# Patient Record
Sex: MALE
[De-identification: names, ages, dates, MRNs, and addresses within clinical notes are randomized per-mention and may not be internally consistent; named-entity substitution may affect disease eponyms.]

---

## 2017-04-04 NOTE — EDM.PDOC
ED HISTORY OF PRESENT ILLNESS





- General


Chief Complaint: Respiratory Problem


Stated Complaint: SHORTNESS OF BREATH


Time Seen by Provider: 04/04/17 23:05





- History of Present Illness


INITIAL COMMENTS - FREE TEXT/NARRATIVE: 





HISTORY AND PHYSICAL:





History of present illness:


Patient is a 61-year-old white male with a 30+ pack year history smoking quit 

about 6 years prior who was recently put on inhalers and antibiotics for a 

respiratory infection and shortness of breath he presents tonight with 

increasing shortness of breath he denies chest pain nausea vomiting diaphoresis 

or other concerns he states he has a history of hypertension but takes no 

medication





Review of systems: 


As per history of present illness and below otherwise all systems reviewed and 

negative.





Past medical history: 


As per history of present illness and as reviewed below otherwise 

noncontributory.





Surgical history: 


As per history of present illness and as reviewed below otherwise 

noncontributory.





Social history: 


No reported history of drug or alcohol abuse.





Family history: 


As per history of present illness and as reviewed below otherwise 

noncontributory.





Physical exam:


HEENT: Atraumatic, normocephalic, pupils reactive, negative for conjunctival 

pallor or scleral icterus, mucous membranes moist, throat clear, neck supple, 

nontender, trachea midline.


Lungs: Diminished bilaterally with end expiratory wheezing scant basilar 

crackles noted, breath sounds equal bilaterally, chest nontender.


Heart: S1S2, regular, negative for clicks, rubs, or JVD.


Abdomen: Soft, nondistended, nontender. Negative for masses or 

hepatosplenomegaly. Negative for costovertebral tenderness.


Pelvis: Stable nontender.


Genitourinary: Deferred.


Rectal: Deferred.


Extremities: Atraumatic, negative for cords or calf pain. Neurovascular 

unremarkable.


Neuro: Awake, alert, oriented. Cranial nerves II through XII unremarkable. 

Cerebellum unremarkable. Motor and sensory unremarkable throughout. Exam 

nonfocal.





Diagnostics:


CBC CMP troponin PT/INR chest x-ray EKG





Therapeutics:


IV O2 monitor nitro paste 1 inch to chest wall aspirin 324 mg by mouth 

albuterol ipratropium nebulizer Solu-Medrol 125 mg IV





Impression: 


#1 acute dyspnea #2 hypertension #3 congestive heart failure #4 COPD





Definitive disposition and diagnosis as appropriate pending reevaluation and 

review of above.





- Related Data


Allergies/ADRs: 


 Allergies











Allergy/AdvReac Type Severity Reaction Status Date / Time


 


No Known Allergies Allergy   Verified 04/04/17 23:04











Home Meds: 


 Home Meds





Albuterol [Ventolin HFA] 0 gm INH ASDIRECTED 04/04/17 [History]











Social & Family History





- Tobacco Use


Smoking Status *Q: Former Smoker (quit 3 years ago)





- Alcohol Use


Days Per Week of Alcohol Use: 0





- Recreational Drug Use


Recreational Drug Use: No


Drug Use in Last 12 Months: No





ED ROS GENERAL





- Review of Systems


Review Of Systems: ROS reveals no pertinent complaints other than HPI.





ED EXAM, GENERAL





- Physical Exam


Exam: See Below (See dictation)





Course





- Vital Signs


Text/Narrative:: 





Patient has marked improvement of his dyspnea in the emergency department he 

remained without chest pain nausea vomiting or diaphoresis patient's blood 

pressure remained elevated and nitroglycerin drip was initiated patient will be 

admitted to the ICU with diagnoses of #1 CHF #2 COPD #3 dyspnea #4 hypertension 

#5 medical noncompliance


Last Recorded V/S: 


 Last Vital Signs











Temp  37.1 C   04/04/17 23:07


 


Pulse  97   04/04/17 23:07


 


Resp  22 H  04/04/17 23:07


 


BP  225/141 H  04/04/17 23:07


 


Pulse Ox  95   04/04/17 23:07














- Orders/Labs/Meds


Orders: 


 Active Orders 24 hr











 Category Date Time Status


 


 Cardiac Monitoring [RC] .AS DIRECTED Care  04/04/17 23:05 Active


 


 EKG Documentation Completion [RC] STAT Care  04/04/17 23:05 Active


 


 Oxygen Therapy, ED [RC] ASDIRECTED Care  04/04/17 23:05 Active


 


 Pulse Oximetry [RC] ASDIRECTED Care  04/04/17 23:05 Active


 


 RT Aerosol Therapy [RC] ASDIRECTED Care  04/04/17 23:07 Active


 


 Chest 2V [CR] Stat Exams  04/04/17 23:06 Taken


 


 Nitroglycerin/D5W [Nitroglycerin  25 MG/D5W 250 ML] Med  04/05/17 00:30 Active





 25 mg in 250 ml IV TITRATE   


 


 Sodium Chloride 0.9% [Normal Saline] 1,000 ml Med  04/04/17 23:15 Active





 IV STAT   


 


 Sodium Chloride 0.9% [Saline Flush] Med  04/04/17 23:06 Active





 10 ml FLUSH ASDIRECTED PRN   


 


 Sodium Chloride 0.9% [Saline Flush] Med  04/04/17 23:06 Active





 2.5 ml FLUSH ASDIRECTED PRN   


 


 Saline Lock Insert [OM.PC] Stat Oth  04/04/17 23:05 Ordered








 Medication Orders





Sodium Chloride (Normal Saline)  1,000 mls @ 125 mls/hr IV STAT DIXIE


   Last Admin: 04/04/17 23:21  Dose: 125 mls/hr


Nitroglycerin/Dextrose (Nitroglycerin  25 Mg/D5w 250 Ml)  25 mg in 250 mls @ 12 

mls/hr IV TITRATE DIXIE; 20 MCG/MIN


   PRN Reason: Protocol


Sodium Chloride (Saline Flush)  10 ml FLUSH ASDIRECTED PRN


   PRN Reason: Keep Vein Open


Sodium Chloride (Saline Flush)  2.5 ml FLUSH ASDIRECTED PRN


   PRN Reason: Keep Vein Open








Labs: 


 Laboratory Tests











  04/04/17 04/04/17 04/04/17 Range/Units





  23:35 23:35 23:35 


 


WBC  6.33    (4.0-11.0)  K/uL


 


RBC  2.98 L    (4.50-5.90)  M/uL


 


Hgb  9.1 L    (13.0-17.0)  g/dL


 


Hct  27.2 L    (38.0-50.0)  %


 


MCV  91.3    (80.0-98.0)  fL


 


MCH  30.5    (27.0-32.0)  pg


 


MCHC  33.5    (31.0-37.0)  g/dL


 


RDW Std Deviation  44.8    (28.0-62.0)  fl


 


RDW Coeff of Francisco  14    (11.0-15.0)  %


 


Plt Count  254    (150-400)  K/uL


 


MPV  10.20    (7.40-12.00)  fL


 


Neut % (Auto)  76.8    (48.0-80.0)  %


 


Lymph % (Auto)  15.8 L    (16.0-40.0)  %


 


Mono % (Auto)  6.2    (0.0-15.0)  %


 


Eos % (Auto)  0.9    (0.0-7.0)  %


 


Baso % (Auto)  0.3    (0.0-1.5)  %


 


Neut # (Auto)  4.9    (1.4-5.7)  K/uL


 


Lymph # (Auto)  1.0    (0.6-2.4)  K/uL


 


Mono # (Auto)  0.4    (0.0-0.8)  K/uL


 


Eos # (Auto)  0.1    (0.0-0.7)  K/uL


 


Baso # (Auto)  0.0    (0.0-0.1)  K/uL


 


Nucleated RBC %  0.0    /100WBC


 


Nucleated RBCs #  0    K/uL


 


INR   1.17 H   (0.86-1.11)  


 


Sodium    136  (136-146)  mmol/L


 


Potassium    3.6  (3.5-5.1)  mmol/L


 


Chloride    102  ()  mmol/L


 


Carbon Dioxide    18 L  (21-31)  mmol/L


 


BUN    110 H  (6.0-23.0)  mg/dL


 


Creatinine    8.8 H  (0.6-1.5)  mg/dL


 


Est Cr Clr Drug Dosing    TNP  


 


Estimated GFR (MDRD)    6.2  ml/min


 


Glucose    119 H  ()  mg/dL


 


Calcium    8.0 L  (8.8-10.8)  mg/dL


 


Total Bilirubin    0.2  (0.1-1.5)  mg/dL


 


AST    35  (5-40)  IU/L


 


ALT    22  (8-54)  IU/L


 


Alkaline Phosphatase    53  ()  


 


Troponin I     (0.0-0.29)  NG/ML


 


B-Natriuretic Peptide     (<100)  PG/ML


 


Total Protein    6.2  (6.0-8.0)  g/dL


 


Albumin    3.1 L  (3.5-5.0)  g/dL


 


Globulin    3.1  (2.0-3.5)  g/dL


 


Albumin/Globulin Ratio    1.0 L  (1.3-2.8)  














  04/04/17 04/04/17 Range/Units





  23:35 23:35 


 


WBC    (4.0-11.0)  K/uL


 


RBC    (4.50-5.90)  M/uL


 


Hgb    (13.0-17.0)  g/dL


 


Hct    (38.0-50.0)  %


 


MCV    (80.0-98.0)  fL


 


MCH    (27.0-32.0)  pg


 


MCHC    (31.0-37.0)  g/dL


 


RDW Std Deviation    (28.0-62.0)  fl


 


RDW Coeff of Francisco    (11.0-15.0)  %


 


Plt Count    (150-400)  K/uL


 


MPV    (7.40-12.00)  fL


 


Neut % (Auto)    (48.0-80.0)  %


 


Lymph % (Auto)    (16.0-40.0)  %


 


Mono % (Auto)    (0.0-15.0)  %


 


Eos % (Auto)    (0.0-7.0)  %


 


Baso % (Auto)    (0.0-1.5)  %


 


Neut # (Auto)    (1.4-5.7)  K/uL


 


Lymph # (Auto)    (0.6-2.4)  K/uL


 


Mono # (Auto)    (0.0-0.8)  K/uL


 


Eos # (Auto)    (0.0-0.7)  K/uL


 


Baso # (Auto)    (0.0-0.1)  K/uL


 


Nucleated RBC %    /100WBC


 


Nucleated RBCs #    K/uL


 


INR    (0.86-1.11)  


 


Sodium    (136-146)  mmol/L


 


Potassium    (3.5-5.1)  mmol/L


 


Chloride    ()  mmol/L


 


Carbon Dioxide    (21-31)  mmol/L


 


BUN    (6.0-23.0)  mg/dL


 


Creatinine    (0.6-1.5)  mg/dL


 


Est Cr Clr Drug Dosing    


 


Estimated GFR (MDRD)    ml/min


 


Glucose    ()  mg/dL


 


Calcium    (8.8-10.8)  mg/dL


 


Total Bilirubin    (0.1-1.5)  mg/dL


 


AST    (5-40)  IU/L


 


ALT    (8-54)  IU/L


 


Alkaline Phosphatase    ()  


 


Troponin I   0.27  (0.0-0.29)  NG/ML


 


B-Natriuretic Peptide  > 3306 H   (<100)  PG/ML


 


Total Protein    (6.0-8.0)  g/dL


 


Albumin    (3.5-5.0)  g/dL


 


Globulin    (2.0-3.5)  g/dL


 


Albumin/Globulin Ratio    (1.3-2.8)  











Meds: 


Medications











Generic Name Dose Route Start Last Admin





  Trade Name Freq  PRN Reason Stop Dose Admin


 


Sodium Chloride  1,000 mls @ 125 mls/hr  04/04/17 23:15  04/04/17 23:21





  Normal Saline  IV   125 mls/hr





  STAT DIXIE   Administration


 


Nitroglycerin/Dextrose  25 mg in 250 mls @ 12 mls/hr  04/05/17 00:30  





  Nitroglycerin  25 Mg/D5w 250 Ml  IV   





  TITRATE DIXIE   





  Protocol   





  20 MCG/MIN   


 


Sodium Chloride  10 ml  04/04/17 23:06  





  Saline Flush  FLUSH   





  ASDIRECTED PRN   





  Keep Vein Open   


 


Sodium Chloride  2.5 ml  04/04/17 23:06  





  Saline Flush  FLUSH   





  ASDIRECTED PRN   





  Keep Vein Open   














Discontinued Medications














Generic Name Dose Route Start Last Admin





  Trade Name Freq  PRN Reason Stop Dose Admin


 


Albuterol/Ipratropium  3 ml  04/04/17 23:06  04/04/17 23:25





  Duoneb 3.0-0.5 Mg/3 Ml  NEB  04/04/17 23:07  3 ml





  ONETIME ONE   Administration


 


Albuterol/Ipratropium  Confirm  04/04/17 23:06  04/04/17 23:26





  Duoneb 3.0-0.5 Mg/3 Ml  Administered  04/04/17 23:07  Not Given





  Dose   





  3 ml   





  .ROUTE   





  .STK-MED ONE   


 


Aspirin  324 mg  04/04/17 23:06  04/04/17 23:20





  Aspirin  PO  04/04/17 23:07  324 mg





  ONETIME ONE   Administration


 


Furosemide  40 mg  04/05/17 00:13  





  Lasix  IVPUSH  04/05/17 00:14  





  NOW ONE   


 


Methylprednisolone Sodium Succinate  125 mg  04/04/17 23:06  04/04/17 23:21





  Solu-Medrol  IVPUSH  04/04/17 23:07  125 mg





  ONETIME ONE   Administration


 


Nitroglycerin  1 gm  04/04/17 23:06  04/04/17 23:20





  Nitro-Bid 2%  TOP  04/04/17 23:07  1 gm





  ONETIME ONE   Administration














Departure





- Departure


Time of Disposition: 00:22


Disposition: Admitted As Inpatient 68


Condition: fair


Clinical Impression: 


 Congestive heart failure, Hypertension, COPD (chronic obstructive pulmonary 

disease)





Forms:  ED Department Discharge





- My Orders


Last 24 Hours: 


My Active Orders





04/04/17 23:05


Cardiac Monitoring [RC] .AS DIRECTED 


EKG Documentation Completion [RC] STAT 


Oxygen Therapy, ED [RC] ASDIRECTED 


Pulse Oximetry [RC] ASDIRECTED 


Saline Lock Insert [OM.PC] Stat 





04/04/17 23:06


Chest 2V [CR] Stat 


Sodium Chloride 0.9% [Saline Flush]   10 ml FLUSH ASDIRECTED PRN 


Sodium Chloride 0.9% [Saline Flush]   2.5 ml FLUSH ASDIRECTED PRN 





04/04/17 23:07


RT Aerosol Therapy [RC] ASDIRECTED 





04/04/17 23:15


Sodium Chloride 0.9% [Normal Saline] 1,000 ml IV STAT 





04/05/17 00:30


Nitroglycerin/D5W [Nitroglycerin  25 MG/D5W 250 ML] 25 mg in 250 ml IV TITRATE 














- Assessment/Plan


Last 24 Hours: 


My Active Orders





04/04/17 23:05


Cardiac Monitoring [RC] .AS DIRECTED 


EKG Documentation Completion [RC] STAT 


Oxygen Therapy, ED [RC] ASDIRECTED 


Pulse Oximetry [RC] ASDIRECTED 


Saline Lock Insert [OM.PC] Stat 





04/04/17 23:06


Chest 2V [CR] Stat 


Sodium Chloride 0.9% [Saline Flush]   10 ml FLUSH ASDIRECTED PRN 


Sodium Chloride 0.9% [Saline Flush]   2.5 ml FLUSH ASDIRECTED PRN 





04/04/17 23:07


RT Aerosol Therapy [RC] ASDIRECTED 





04/04/17 23:15


Sodium Chloride 0.9% [Normal Saline] 1,000 ml IV STAT 





04/05/17 00:30


Nitroglycerin/D5W [Nitroglycerin  25 MG/D5W 250 ML] 25 mg in 250 ml IV TITRATE

## 2017-04-05 NOTE — PCM.PN
- General Info


Date of Service: 04/05/17


Functional Status: Reports: urinating





- Review of Systems


General: Reports: No Symptoms


HEENT: Reports: no symptoms


Pulmonary: Reports: no symptoms


Cardiovascular: Reports: No Symptoms


Gastrointestinal: Reports: No symptoms


Genitourinary: Reports: no symptoms


Musculoskeletal: Reports: no symptoms, leg pain (had cramps last night, helped 

by improvised foot board)


Skin: Reports: no symptoms


Neurological: Reports: No Symptoms


Psychiatric: Reports: no symptoms





- Patient Data


Vitals - most recent: 


 Last Vital Signs











Temp  36.6 C   04/05/17 08:00


 


Pulse  91   04/05/17 02:45


 


Resp  23 H  04/05/17 08:00


 


BP  156/107 H  04/05/17 08:00


 


Pulse Ox  98   04/05/17 08:00











Weight - most recent: 79 kg


I&O - last 24 hours: 


 Intake & Output











 04/04/17 04/05/17 04/05/17





 22:59 06:59 14:59


 


Intake Total  250 


 


Output Total  470 


 


Balance  -220 











Lab Results last 24 hrs: 


 Laboratory Results - last 24 hr











  04/05/17 04/05/17 Range/Units





  05:04 05:04 


 


Sodium  136   (136-146)  mmol/L


 


Potassium  3.9   (3.5-5.1)  mmol/L


 


Chloride  103   ()  mmol/L


 


Carbon Dioxide  16 L   (21-31)  mmol/L


 


BUN  106 H   (6.0-23.0)  mg/dL


 


Creatinine  8.5 H   (0.6-1.5)  mg/dL


 


Est Cr Clr Drug Dosing  9.66   mL/min


 


Estimated GFR (MDRD)  6.5   ml/min


 


Glucose  143 H   ()  mg/dL


 


Calcium  7.7 L   (8.8-10.8)  mg/dL


 


Troponin I   0.24  (0.0-0.29)  NG/ML











Med Orders - Current: 


 Current Medications





Acetaminophen (Tylenol)  650 mg PO Q4H PRN


   PRN Reason: Pain (Mild 1-3)/fever


Albuterol/Ipratropium (Duoneb 3.0-0.5 Mg/3 Ml)  3 ml NEB Q4HRRT PRN


   PRN Reason: Shortness Of Breath/wheezing


Clonidine HCl (Catapres)  0.2 mg PO Q8H DIXIE


   Last Admin: 04/05/17 01:52 Dose:  0.2 mg


Sodium Chloride (Normal Saline)  1,000 mls @ 25 mls/hr IV STAT DIXIE


   Last Admin: 04/04/17 23:21 Dose:  125 mls/hr


Nitroglycerin/Dextrose (Nitroglycerin  25 Mg/D5w 250 Ml)  25 mg in 250 mls @ 12 

mls/hr IV TITRATE DIXIE; 20 MCG/MIN


   PRN Reason: Protocol


   Last Titration: 04/05/17 06:00 Dose:  5 mcg/min, 3 mls/hr


Sodium Chloride (Saline Flush)  10 ml FLUSH ASDIRECTED PRN


   PRN Reason: Keep Vein Open


Sodium Chloride (Saline Flush)  2.5 ml FLUSH ASDIRECTED PRN


   PRN Reason: Keep Vein Open





Discontinued Medications





Albuterol/Ipratropium (Duoneb 3.0-0.5 Mg/3 Ml)  3 ml NEB ONETIME ONE


   Stop: 04/04/17 23:07


   Last Admin: 04/04/17 23:25 Dose:  3 ml


Albuterol/Ipratropium (Duoneb 3.0-0.5 Mg/3 Ml) Confirm Administered Dose 3 ml 

.ROUTE .STK-MED ONE


   Stop: 04/04/17 23:07


   Last Admin: 04/04/17 23:26 Dose:  Not Given


Aspirin (Aspirin)  324 mg PO ONETIME ONE


   Stop: 04/04/17 23:07


   Last Admin: 04/04/17 23:20 Dose:  324 mg


Furosemide (Lasix)  40 mg IVPUSH NOW ONE


   Stop: 04/05/17 00:14


   Last Admin: 04/05/17 00:30 Dose:  40 mg


Labetalol HCl (Normodyne)  5 mg IVPUSH ONETIME ONE


   PRN Reason: Protocol


   Stop: 04/05/17 02:18


   Last Admin: 04/05/17 02:45 Dose:  5 mg


Methylprednisolone Sodium Succinate (Solu-Medrol)  125 mg IVPUSH ONETIME ONE


   Stop: 04/04/17 23:07


   Last Admin: 04/04/17 23:21 Dose:  125 mg


Nitroglycerin (Nitro-Bid 2%)  1 gm TOP ONETIME ONE


   Stop: 04/04/17 23:07


   Last Admin: 04/04/17 23:20 Dose:  1 gm











- Exam


General: alert


HEENT: Pupils equal


Neck: trachea midline


Lungs: Clear to auscultation


Cardiovascular: Regular Rate


Abdomen: bowel sounds present


 (Male) Exam: Deferred


Back Exam: normal inspection


Extremities: no edema


Skin: warm, intact


Wound/Incisions: healing well


Neurological: no new focal deficit


Psy/Mental Status: alert, normal affect





- Problem List Review


Problem List Initiated/Reviewed/Updated: Yes





- My Orders


Last 24 Hours: 


My Active Orders





04/05/17 00:59


Patient Status [ADT] Routine 


Oxygen Therapy [RC] PRN 


Vital Signs [RC] Q1H 


Acetaminophen [Tylenol]   650 mg PO Q4H PRN 


Albuterol/Ipratropium [DuoNeb 3.0-0.5 MG/3 ML]   3 ml NEB Q4HRRT PRN 


Resuscitation Status Routine 





04/05/17 01:06


RT Aerosol Therapy [RC] ASDIRECTED 





04/05/17 02:00


cloNIDine [Catapres]   0.2 mg PO Q8H 





04/05/17 11:30


TROPONIN I [CHEM] Q6H 





04/05/17 Breakfast


Regular Diet [DIET] 














- Assessment


Assessment:: 





blood pressure better with labetalol, 


will continue p o along with catapres will stop NTG  as ineffective/unnecessary





azotemia only marginally better after hydration 


willl need nephrology evaluation





Lung masses


imaging and biopsy 


both of the latter in [place]. Pt willing to be transferred, will arrange





- Plan


Plan:: 





hypertensive urgency


little effect of NTG drip, will add catapres, possibly other agents


Pt rports lisinopril caused hyperkalemia in past





CHF like symptoms have improved already ,certainly not fluid overloaded





 renal failure  ?acute  doubt pure dehydration, may be acute on chronic





lung mass, bilateral    need imaging when stable





Hx hepatitis C

## 2017-04-05 NOTE — PCM.HP
H&P History of Present Illness





- General


Date of Service: 04/05/17


Admit Problem/Dx: 


 Admission Diagnosis/Problem





Admission Diagnosis/Problem      CHF, Congestive heart failure








Source of Information: Patient


History Limitations: Reports: No limitations





- History of Present Illness


Initial Comments - Free Text/Narative: 





62 yo man with history hypertension,but off meds quite a while experiecing 

worsening fatigue, exertional and orthostatic dyspnea,says he has not slept for 

days. denies chest pain or edema.


Comes to ER with BP  [id number]


Onset of Symptoms: Reports: gradual


Duration of Symptoms: Reports: Week(s):


Improves with: Reports: None


Worsens with: Reports: None


Associated Symptoms: Reports: no other symptoms





- Related Data


Allergies/Adverse Reactions: 


 Allergies











Allergy/AdvReac Type Severity Reaction Status Date / Time


 


No Known Allergies Allergy   Verified 04/04/17 23:04











Home Medications: 


 Home Meds





Albuterol [Ventolin HFA] 0 gm INH ASDIRECTED 04/04/17 [History]











Past Medical History


HEENT History: Reports: Impaired vision


Other HEENT History: wears glasses


Cardiovascular History: Reports: Hypertension


Respiratory History: Reports: Other (see below)


Other Respiratory History: former smoker


Gastrointestinal History: Reports: Hepatitis (interferon for hvc 6 yrs ago)





- Past Surgical History


Other Cardiovascular Surgeries/Procedures: off htn meds x 4-5 yrs


GI Surgical History: Reports: Other (see below)


Other GI Surgeries/Procedures: hernia repair x 3





Social & Family History





- Family History


Family Medical History: Noncontributory (M   F A&W 4 brothers 3 offspring [place])





- Tobacco Use


Smoking Status *Q: Former Smoker (quit 3 years ago)


Tobacco Use Within Last Twelve Months: No


Years of Tobacco use: 30


Tobacco Use Comment: quit  6 yrs ago





- Caffeine Use


Caffeine Use: Reports: Coffee





- Alcohol Use


Days Per Week of Alcohol Use: 0





- Recreational Drug Use


Recreational Drug Use: Yes


Drug Use in Last 12 Months: No


Recreational Drug Type: Reports: Amphetamines (Speed) (remote> 20 yr parenteral)





- Living Situation & Occupation


Living situation: Reports: 


Occupation: employed ([employer], 8yr [employer] )





H&P Review of Systems





- Review of Systems:


Review Of Systems: See Below


General: Reports: fatigue


HEENT: Reports: no symptoms


Pulmonary: Reports: Shortness of Breath


Cardiovascular: Reports: no symptoms


Gastrointestinal: Reports: No symptoms


Genitourinary: Reports: no symptoms


Musculoskeletal: Reports: no symptoms


Skin: Reports: no symptoms


Psychiatric: Reports: no symptoms


Neurological: Reports: No Symptoms


Hematologic/Lymphatic: Reports: no symptoms


Immunologic: Reports: no symptoms





Exam





- Exam


Exam: See Below





- Vital Signs


Vital Signs: 


 Last Vital Signs











Temp  37.1 C   04/04/17 23:07


 


Pulse  97   04/04/17 23:07


 


Resp  22 H  04/04/17 23:07


 


BP  225/141 H  04/04/17 23:07


 


Pulse Ox  95   04/04/17 23:07











Weight: 79.379 kg





- Exam


General: alert, oriented


HEENT: Other (hazy lenticular opacities)


Neck: trachea midline


Lungs: Rales (R base)


Cardiovascular: regular rate, regular rhythm


Abdomen: normal bowel sounds


 (Male) Exam: Deferred


Rectal (Males) Exam: Deferred


Back Exam: normal inspection


Extremities: normal inspection


Skin: warm


Neurological: cranial nerves intact


Neuro Extensive - Mental Status: alert, oriented x3, normal mood/affect, memory 

intact


Neuro Extensive - Motor, Sensory, Reflexes: CN II-XII intact (not formally 

tested)


Psychiatric: alert, normal affect





- Patient Data


Result Diagrams: 


 04/04/17 23:35





 04/04/17 23:35





*Q Meaningful Use (ADM)





- VTE *Q


VTE Criteria *Q: 








- Stroke *Q


Stroke Criteria *Q: 








- AMI *Q


AMI Criteria *Q: 





Problem List Initiated/Reviewed/Updated: Yes


Orders Last 24hrs: 


 Active Orders 24 hr











 Category Date Time Status


 


 Patient Status [ADT] Routine ADT  04/05/17 00:59 Ordered


 


 Oxygen Therapy [RC] PRN Care  04/05/17 00:59 Ordered


 


 RT Aerosol Therapy [RC] ASDIRECTED Care  04/05/17 01:06 Ordered


 


 Vital Signs [RC] Q4H Care  04/05/17 00:59 Ordered


 


 Regular Diet [DIET] Diet  04/05/17 Breakfast Ordered


 


 BMP [BASIC METABOLIC PANEL,BMP] [CHEM] Routine Lab  04/05/17 06:00 Ordered


 


 Acetaminophen [Tylenol] Med  04/05/17 00:59 Ordered





 650 mg PO Q4H PRN   


 


 Albuterol/Ipratropium [DuoNeb 3.0-0.5 MG/3 ML] Med  04/05/17 00:59 Ordered





 3 ml NEB Q4HRRT PRN   


 


 Resuscitation Status Routine Resus Stat  04/05/17 00:59 Ordered








 Medication Orders





Acetaminophen (Tylenol)  650 mg PO Q4H PRN


   PRN Reason: Pain (Mild 1-3)/fever


Albuterol/Ipratropium (Duoneb 3.0-0.5 Mg/3 Ml)  3 ml NEB Q4HRRT PRN


   PRN Reason: Shortness Of Breath/wheezing


Sodium Chloride (Normal Saline)  1,000 mls @ 25 mls/hr IV STAT DIXIE


   Last Admin: 04/04/17 23:21  Dose: 125 mls/hr


Nitroglycerin/Dextrose (Nitroglycerin  25 Mg/D5w 250 Ml)  25 mg in 250 mls @ 12 

mls/hr IV TITRATE DIXIE; 20 MCG/MIN


   PRN Reason: Protocol


   Last Admin: 04/05/17 00:34  Dose: 20 mcg/min, 12 mls/hr


Sodium Chloride (Saline Flush)  10 ml FLUSH ASDIRECTED PRN


   PRN Reason: Keep Vein Open


Sodium Chloride (Saline Flush)  2.5 ml FLUSH ASDIRECTED PRN


   PRN Reason: Keep Vein Open








Assessment/Plan Comment:: 





hypertensive urgency


little effect of NTG drip, will add catapres, possibly other agents


Pt rports lisinopril caused hyperkalemia in past





CHF like symptoms have improved already ,certainly not fluid overloaded





 renal failure  ?acute  doubt pure dehydration, may be acute on chronic





lung mass, bilateral    need imaging when stable





Hx hepatitis C

## 2017-04-05 NOTE — PCM.DCSUM1
**Discharge Summary





- Hospital Course


Free Text/Narrative:: 





61 y o man off medications for hypertension feeling fatigued and dyspneic, 

especially recumbent, comes to ER with blood pressur [id number] /[id number] He was initially 

treated with diuretics til creatinine reported after which he got fluid and a 

nitroglycerine drip.  His dysspnea improved rapidly but not the blood 

pressure.Dose was limited by headache and he responded better to catapres and 

labetalol,with pressures [id number]


He got hydration overnight, but creatinine remained essentially the same at 8. 

in asddition the chest X ray showed pulmonary masses, R perihilar and L 

peripheral,  Troponin was top normal and remained so


 Because of his precarious renal condition and need for specialist care, he was 

transferred to [facility] with Dr Loo accepting





- Discharge Data


Discharge Date: 04/05/17


Discharge Disposition: DC/Tfer to Acute Hospital 02


Condition: Fair





- Discharge Diagnosis/Problem(s)


(1) Acute renal failure


SNOMED Code(s): 67283588


   ICD Code: N17.9 - ACUTE KIDNEY FAILURE, UNSPECIFIED   Status: Acute   

Current Visit: Yes   


Qualifiers: 


   Acute renal failure type: unspecified   Qualified Code(s): N17.9 - Acute 

kidney failure, unspecified   





(2) COPD (chronic obstructive pulmonary disease)


SNOMED Code(s): 30496894


   ICD Code: J44.9 - CHRONIC OBSTRUCTIVE PULMONARY DISEASE, UNSPECIFIED   Status

: Acute   Current Visit: Yes   


Qualifiers: 


   COPD type: unspecified COPD   Qualified Code(s): J44.9 - Chronic obstructive 

pulmonary disease, unspecified   





(3) Congestive heart failure


SNOMED Code(s): 59886660


   ICD Code: I50.9 - HEART FAILURE, UNSPECIFIED   Status: Acute   Current Visit

: Yes   


Qualifiers: 


   Congestive heart failure type: combined   Congestive heart failure chronicity

: acute   Qualified Code(s): I50.41 - Acute combined systolic (congestive) and 

diastolic (congestive) heart failure   





(4) Hypertension


SNOMED Code(s): 32738469


   ICD Code: I10 - ESSENTIAL (PRIMARY) HYPERTENSION   Status: Acute   Current 

Visit: Yes   


Qualifiers: 


   Hypertension type: unspecified secondary hypertension   Qualified Code(s): 

I15.9 - Secondary hypertension, unspecified; I15 - Secondary hypertension   





- Patient Summary/Data


Hospital Course: 





see above





- Patient Instructions


Diet: Renal Diet


Activity: As Tolerated





- Discharge Plan


Prescriptions/Med Rec: 


Sodium Chloride 0.9% [Normal Saline] 100 ml IV STAT #1000 bag


Home Medications: 


 Home Meds





Albuterol [Ventolin HFA] 0 gm INH ASDIRECTED 04/04/17 [History]


Sodium Chloride 0.9% [Normal Saline] 100 ml IV STAT #1000 bag 04/05/17 [Rx]








Forms:  ED Department Discharge


Referrals: 


PCP,None [Primary Care Provider] - 





- Discharge Summary/Plan Comment


DC Time >30 min.: No





- General Info


Date of Service: 04/05/17





- Patient Data


Vitals - Most Recent: 


 Last Vital Signs











Temp  36.6 C   04/05/17 08:00


 


Pulse  91   04/05/17 02:45


 


Resp  18   04/05/17 10:00


 


BP  171/119 H  04/05/17 10:42


 


Pulse Ox  98   04/05/17 08:00











Weight - Most Recent: 79 kg


I&O - Last 24 hours: 


 Intake & Output











 04/04/17 04/05/17 04/05/17





 22:59 06:59 14:59


 


Intake Total  250 


 


Output Total  470 


 


Balance  -220 











Lab Results - Last 24 hrs: 


 Laboratory Results - last 24 hr











  04/05/17 04/05/17 Range/Units





  05:04 05:04 


 


Sodium  136   (136-146)  mmol/L


 


Potassium  3.9   (3.5-5.1)  mmol/L


 


Chloride  103   ()  mmol/L


 


Carbon Dioxide  16 L   (21-31)  mmol/L


 


BUN  106 H   (6.0-23.0)  mg/dL


 


Creatinine  8.5 H   (0.6-1.5)  mg/dL


 


Est Cr Clr Drug Dosing  9.66   mL/min


 


Estimated GFR (MDRD)  6.5   ml/min


 


Glucose  143 H   ()  mg/dL


 


Calcium  7.7 L   (8.8-10.8)  mg/dL


 


Troponin I   0.24  (0.0-0.29)  NG/ML











Med Orders - Current: 


 Current Medications





Acetaminophen (Tylenol)  650 mg PO Q4H PRN


   PRN Reason: Pain (Mild 1-3)/fever


Albuterol/Ipratropium (Duoneb 3.0-0.5 Mg/3 Ml)  3 ml NEB Q4HRRT PRN


   PRN Reason: Shortness Of Breath/wheezing


Clonidine HCl (Catapres)  0.2 mg PO Q8H DIXIE


   Last Admin: 04/05/17 10:42 Dose:  0.2 mg


Sodium Chloride (Normal Saline)  1,000 mls @ 25 mls/hr IV STAT DIXIE


   Last Admin: 04/04/17 23:21 Dose:  125 mls/hr


Labetalol HCl (Normodyne)  100 mg PO TID DIXIE


Sodium Chloride (Saline Flush)  10 ml FLUSH ASDIRECTED PRN


   PRN Reason: Keep Vein Open


Sodium Chloride (Saline Flush)  2.5 ml FLUSH ASDIRECTED PRN


   PRN Reason: Keep Vein Open


Thiamine HCl (Vitamin B-1)  100 mg PO BEDTIME DIXIE





Discontinued Medications





Albuterol/Ipratropium (Duoneb 3.0-0.5 Mg/3 Ml)  3 ml NEB ONETIME ONE


   Stop: 04/04/17 23:07


   Last Admin: 04/04/17 23:25 Dose:  3 ml


Albuterol/Ipratropium (Duoneb 3.0-0.5 Mg/3 Ml) Confirm Administered Dose 3 ml 

.ROUTE .STK-MED ONE


   Stop: 04/04/17 23:07


   Last Admin: 04/04/17 23:26 Dose:  Not Given


Aspirin (Aspirin)  324 mg PO ONETIME ONE


   Stop: 04/04/17 23:07


   Last Admin: 04/04/17 23:20 Dose:  324 mg


Furosemide (Lasix)  40 mg IVPUSH NOW ONE


   Stop: 04/05/17 00:14


   Last Admin: 04/05/17 00:30 Dose:  40 mg


Nitroglycerin/Dextrose (Nitroglycerin  25 Mg/D5w 250 Ml)  25 mg in 250 mls @ 12 

mls/hr IV TITRATE DIXIE; 20 MCG/MIN


   PRN Reason: Protocol


   Last Titration: 04/05/17 06:00 Dose:  5 mcg/min, 3 mls/hr


Labetalol HCl (Normodyne)  5 mg IVPUSH ONETIME ONE


   PRN Reason: Protocol


   Stop: 04/05/17 02:18


   Last Admin: 04/05/17 02:45 Dose:  5 mg


Methylprednisolone Sodium Succinate (Solu-Medrol)  125 mg IVPUSH ONETIME ONE


   Stop: 04/04/17 23:07


   Last Admin: 04/04/17 23:21 Dose:  125 mg


Nitroglycerin (Nitro-Bid 2%)  1 gm TOP ONETIME ONE


   Stop: 04/04/17 23:07


   Last Admin: 04/04/17 23:20 Dose:  1 gm











*Q Meaningful Use (DIS)





- VTE *Q


VTE Criteria *Q: 








- Stroke *Q


Stroke Criteria *Q: 








- AMI *Q


AMI Criteria *Q:

## 2017-04-05 NOTE — CR
EXAM DATE: 17



PATIENT'S AGE: 61





Patient: CEDRICK DAILEY



Facility: [place], [place]

Patient ID: [id number]

Site Patient ID: [id number].

Site Accession #: [id number].

: 1955

Study: XRay Chest [id number]/4/2017 11:50:50 PM

Ordering Physician: Koko Ng



Final Report: 

INDICATION:

Shortness of breath for 3 weeks 



TECHNIQUE:

Chest 2 views.



COMPARISON:

None



FINDINGS:

Normal cardiac size. Patchy infiltrates in the left upper and right lower 
lobes. 3.4 centimeter pulmonary mass in the right lower lobe. Suggestion of a 
nodular density in the right perihilar region measuring 2.5 cm. No pneumothorax 
or effusion. No acute osseous abnormality. 



IMPRESSION:

1. Patchy infiltrates in the left upper and right lower lobes may represent 
infection.



2. Left lower lobe pulmonary mass and possible right perihilar mass. Chest CT 
with IV contrast is recommended for further evaluation.



Dictated by Gladis Barkley MD @ 2017 11:57PM

(Electronic Signature)



Report Signed by Proxy and Original Signed Document filed in the

Medical Record.
[facility]

## 2017-08-28 NOTE — OR
SURGEON:

Gerson Kessler M.D.

 

DATE OF PROCEDURE:  08/28/2017

 

OPERATION PERFORMED:

Removal of right internal jugular venous hemodialysis catheter.

 

ANESTHESIA:

Local MAC.

 

FIRST ASSISTANT:

DEBRA Smalls student.

 

ASA CLASSIFICATION:

IV.

 

PREOPERATIVE DIAGNOSIS:

Desire for external hemodialysis catheter removal with functioning left

radiocephalic fistula.

 

POSTOPERATIVE DIAGNOSIS:

Desire for external hemodialysis catheter removal with functioning left

radiocephalic fistula.

 

ESTIMATED BLOOD LOSS:

5 mL.

 

FLUID REPLACEMENT:

150 mL of crystalloid.

 

DESCRIPTION OF PROCEDURE:

The patient was taken to the operating room and placed on the operating table in

the supine position.  Time-out was called for appropriate identification of the

patient and procedure.  Surgical site had been marked prior to the patient

entering the operating room.  Sedation was provided.  The right chest was

prepped with Betadine solution.  Sterile drapes were applied.  The Dacron cuff

could easily be palpated.  The skin overlying this was infiltrated with 1%

Xylocaine and 0.5% Marcaine solution.  The skin incision was made and using

electrocautery deepened down to the Dacron cuff.  The Dacron cuff was completely

mobilized and freed from the surrounding tissue.  The catheter was then clamped

and cut.  The external portion was removed and then the internal portion

including the cuff was removed.  Pressure was held over the insertion site in

the right internal jugular for a timed 2-minute.  When pressure was released,

there was no bleeding.  The wound was inspected for hemostasis and small

bleeding sites were electrocoagulated.  The incision was closed in 2 layers

approximating the subcutaneous tissue with 3-0 Polysorb and the skin with

subcuticular 4-0 Monocryl.  The incision was Steri-Stripped.  No sutures were

placed at the exit site on the right anterior chest.  The incision was then

dressed with a sterile Tegaderm pad.

 

Sponge, needle, and instrument counts were all correct.  The patient tolerated

the procedure well and was taken to recovery room in stable condition.

 

 

JANICE / ALEJANDRO

DD:  08/28/2017 10:27:25

DT:  08/28/2017 18:32:02

Job #:  [id number]

## 2017-08-28 NOTE — PCM.PREANE
Preanesthetic Assessment





- Anesthesia/Transfusion/Family Hx


Anesthesia History: Prior Anesthesia Without Reaction


Family History of Anesthesia Reaction: No


Transfusion History: No Prior Transfusion(s)


Intubation History: Unknown





- Review of Systems


General: No Symptoms


Pulmonary: No Symptoms


Cardiovascular: No Symptoms


Gastrointestinal: No Symptoms


Neurological: No Symptoms


Other: Reports: None





- Physical Assessment


O2 Sat by Pulse Oximetry: 98


Respiratory Rate: 16


Vital Signs: 





 Last Vital Signs











Temp  36.4 C   08/28/17 08:11


 


Pulse  55 L  08/28/17 08:11


 


Resp  16   08/28/17 08:11


 


BP  115/72   08/28/17 08:11


 


Pulse Ox  98   08/28/17 08:11











Height: 1.78 m


Weight: 77.111 kg


ASA Class: 3


Mental Status: Alert & Oriented x3


Airway Class: Mallampati = 2


Dentition: Reports: Missing Tooth/Teeth (multiple upper and lower)


Thyro-Mental Finger Breadths: 3


Mouth Opening Finger Breadths: 3


ROM/Head Extension: Full


Lungs: Clear to Auscultation, Normal Respiratory Effort


Cardiovascular: Regular Rate, Regular Rhythm





- Lab


Values: 





 Laboratory Last Values











WBC  8.23 K/uL (4.0-11.0)   08/28/17  08:03    


 


RBC  3.43 M/uL (4.50-5.90)  L  08/28/17  08:03    


 


Hgb  10.6 g/dL (13.0-17.0)  L  08/28/17  08:03    


 


Hct  31.8 % (38.0-50.0)  L  08/28/17  08:03    


 


MCV  92.7 fL (80.0-98.0)   08/28/17  08:03    


 


MCH  30.9 pg (27.0-32.0)   08/28/17  08:03    


 


MCHC  33.3 g/dL (31.0-37.0)   08/28/17  08:03    


 


RDW Std Deviation  45.7 fl (28.0-62.0)   08/28/17  08:03    


 


RDW Coeff of Francisco  13 % (11.0-15.0)   08/28/17  08:03    


 


Plt Count  277 K/uL (150-400)   08/28/17  08:03    


 


MPV  9.20 fL (7.40-12.00)   08/28/17  08:03    


 


Nucleated RBC %  0.0 /100WBC  08/28/17  08:03    


 


Nucleated RBCs #  0 K/uL  08/28/17  08:03    














- Allergies


Allergies/Adverse Reactions: 


 Allergies











Allergy/AdvReac Type Severity Reaction Status Date / Time


 


No Known Allergies Allergy   Verified 04/04/17 23:04














- Blood


Blood Available: No





- Anesthesia Plan


Pre-Op Medication Ordered: None





- Acknowledgements


Anesthesia Type Planned: MAC


Pt an Appropriate Candidate for the Planned Anesthesia: Yes


Alternatives and Risks of Anesthesia Discussed w Pt/Guardian: Yes


Pt/Guardian Understands and Agrees with Anesthesia Plan: Yes





PreAnesthesia Questionnaire


HEENT History: 


Cardiovascular History: Reports: High Cholesterol, Hypertension


Respiratory History: 


Gastrointestinal History: Reports: Hepatitis


Other Gastrointestinal History: hx hepatitis C, has been treated and is cleared


Genitourinary History: Reports: Dialysis, Renal Disease


Other Genitourinary History: chronic renal failure, dialysis 3 times weekly





- Past Surgical History


Head Surgeries/Procedures: Reports: None


GI Surgical History: Reports: Hernia, Inguinal, Hernia Repair/Other, Other (See 

Below)


Other GI Surgeries/Procedures: inguinal hernia repair x2, umbilical hernia 

repair x1


Other Male  Surgeries/Procedures: creation of lisbet A-V fistula


Other Surgical History Comment: creation of Lisbet A-V fistula, also right IJ 

dyalisis cath placement





- SUBSTANCE USE


Smoking Status *Q: Former Smoker


Tobacco Use Within Last Twelve Months: No


Second Hand Smoke Exposure: No


Days Per Week of Alcohol Use: 0


Recreational Drug Use History: Yes


Recreational Drug Type: 





- HOME MEDS


Home Medications: 


 Home Meds





Calcium Carbonate 500 mg PO TID 08/22/17 [History]


Ferrous Sulfate 325 mg PO DAILY 08/22/17 [History]


Lisinopril 40 mg PO DAILY 08/22/17 [History]


Metoprolol Tartrate 50 mg PO BID 08/22/17 [History]


Multivitamin [Multivitamins] 1 tab PO DAILY 08/22/17 [History]


NIFEdipine [Nifedipine] 2 tab PO TID 08/22/17 [History]











- CURRENT (IN HOUSE) MEDS


Current Meds: 





 Current Medications





Sodium Chloride (Normal Saline)  1,000 mls @ 125 mls/hr IV ASDIRECTED DIXIE


   Last Admin: 08/28/17 08:10 Dose:  125 mls/hr





Discontinued Medications





Fentanyl (Sublimaze) Confirm Administered Dose 100 mcg .ROUTE .STK-MED ONE


   Stop: 08/28/17 07:31


Lactated Ringer's (Ringers, Lactated)  1,000 mls @ 125 mls/hr IV ASDIRECTED DIXIE


Lidocaine (Xylocaine-Mpf 2%) Confirm Administered Dose 5 ml .ROUTE .STK-MED ONE


   Stop: 08/28/17 07:31


Midazolam HCl (Versed 1 Mg/Ml) Confirm Administered Dose 2 mg .ROUTE .STK-MED 

ONE


   Stop: 08/28/17 07:31

## 2017-08-28 NOTE — PCM.OPNOTE
- General Post-Op/Procedure Note


Date of Surgery/Procedure: 08/28/17


Operative Procedure(s): Removal of hemodialysis access catheter


Pre Op Diagnosis: Chronic renal failure with functioning radiocephalic fistula 

on the left side.  Desire for removal of the external hemodialysis access 

catheter.


Post-Op Diagnosis: Same


Anesthesia Technique: Local, MAC (ASA IV)


Primary Surgeon: Gerson Kessler


Assistant: Tasneem Handley


Fluid Replacement, Intraop: 150


EBL in mLs: 5


Condition: Good


Free Text/Narrative:: 





Dictation [id number]

## 2020-06-26 ENCOUNTER — HOSPITAL ENCOUNTER (OUTPATIENT)
Dept: HOSPITAL 56 - MW.SDS | Age: 65
Discharge: HOME | End: 2020-06-26
Attending: SURGERY
Payer: OTHER GOVERNMENT

## 2020-06-26 VITALS — SYSTOLIC BLOOD PRESSURE: 121 MMHG | HEART RATE: 71 BPM | DIASTOLIC BLOOD PRESSURE: 85 MMHG

## 2020-06-26 DIAGNOSIS — I12.9: ICD-10-CM

## 2020-06-26 DIAGNOSIS — D12.0: ICD-10-CM

## 2020-06-26 DIAGNOSIS — N18.9: ICD-10-CM

## 2020-06-26 DIAGNOSIS — Z79.899: ICD-10-CM

## 2020-06-26 DIAGNOSIS — D12.8: ICD-10-CM

## 2020-06-26 DIAGNOSIS — Z80.0: ICD-10-CM

## 2020-06-26 DIAGNOSIS — Z98.890: ICD-10-CM

## 2020-06-26 DIAGNOSIS — Z12.11: Primary | ICD-10-CM

## 2020-06-26 DIAGNOSIS — Z99.2: ICD-10-CM

## 2020-06-26 DIAGNOSIS — D12.5: ICD-10-CM

## 2020-06-26 DIAGNOSIS — K57.30: ICD-10-CM

## 2020-06-26 DIAGNOSIS — Z87.891: ICD-10-CM

## 2020-06-26 DIAGNOSIS — Z86.010: ICD-10-CM

## 2020-06-26 DIAGNOSIS — D12.3: ICD-10-CM

## 2020-06-26 LAB
BUN SERPL-MCNC: 33 MG/DL (ref 7–18)
CHLORIDE SERPL-SCNC: 95 MMOL/L (ref 98–107)
CO2 SERPL-SCNC: 25.5 MMOL/L (ref 21–32)
GLUCOSE SERPL-MCNC: 99 MG/DL (ref 74–106)
POTASSIUM SERPL-SCNC: 4.5 MMOL/L (ref 3.5–5.1)
SODIUM SERPL-SCNC: 134 MMOL/L (ref 136–148)

## 2020-06-26 PROCEDURE — 36415 COLL VENOUS BLD VENIPUNCTURE: CPT

## 2020-06-26 PROCEDURE — 45380 COLONOSCOPY AND BIOPSY: CPT

## 2020-06-26 PROCEDURE — 80048 BASIC METABOLIC PNL TOTAL CA: CPT

## 2020-06-26 PROCEDURE — 45385 COLONOSCOPY W/LESION REMOVAL: CPT

## 2020-06-26 NOTE — [id number]
Post Anesthesia Note





- EVALUATION WITHIN 48HRS OF ANESTHETIC


Vital Signs in Normal Range: Yes


Patient Participated in Evaluation: Yes


Respiratory Function Stable: Yes


Airway Patent: Yes


Cardiovascular Function Stable: Yes


Hydration Status Stable: Yes


Pain Control Satisfactory: Yes


Nausea and Vomiting Control Satisfactory: Yes


Mental Status Recovered: Yes


Vital Signs: 


                                Last Vital Signs











Temp  36.2 C   06/26/20 13:32


 


Pulse  74   06/26/20 14:44


 


Resp  16   06/26/20 14:44


 


BP  103/71   06/26/20 14:44


 


Pulse Ox  97   06/26/20 14:44

## 2020-06-26 NOTE — PCM.PREANE
Preanesthetic Assessment





- Anesthesia/Transfusion/Family Hx


Anesthesia History: Prior Anesthesia Without Reaction


Family History of Anesthesia Reaction: No


Transfusion History: Prior Transfusion Without Reaction


Intubation History: Unknown





- Review of Systems


General: No Symptoms


Pulmonary: No Symptoms


Cardiovascular: No Symptoms


Gastrointestinal: No Symptoms (screening colonoscopy)


Neurological: No Symptoms


Other: Reports: None





- Physical Assessment


Vital Signs: 





                                Last Vital Signs











Temp  36.2 C   06/26/20 13:32


 


Pulse  75   06/26/20 13:32


 


Resp  16   06/26/20 13:32


 


BP  139/78   06/26/20 13:32


 


Pulse Ox  97   06/26/20 13:32











Height: 5 ft 10 in


Weight: 78.471 kg


ASA Class: 4


Mental Status: Alert & Oriented x3


Airway Class: Mallampati = 2


Dentition: Reports: Edentulous


Thyro-Mental Finger Breadths: 3


Mouth Opening Finger Breadths: 3


ROM/Head Extension: Limited/Partial


Lungs: Normal Respiratory Effort, Wheezing (upper right lobe, sats 98% on RA)


Cardiovascular: Regular Rate, Regular Rhythm





- Lab


Values: 





                             Laboratory Last Values











Sodium  134 mmol/L (136-148)  L  06/26/20  12:09    


 


Potassium  4.5 mmol/L (3.5-5.1)   06/26/20  12:09    


 


Chloride  95 mmol/L ()  L  06/26/20  12:09    


 


Carbon Dioxide  25.5 mmol/L (21.0-32.0)   06/26/20  12:09    


 


BUN  33 mg/dL (7.0-18.0)  H  06/26/20  12:09    


 


Creatinine  6.8 mg/dL (0.8-1.3)  H  06/26/20  12:09    


 


Est Cr Clr Drug Dosing  11.63 mL/min  06/26/20  12:09    


 


Estimated GFR (MDRD)  8.3 ml/min  06/26/20  12:09    


 


Glucose  99 mg/dL ()   06/26/20  12:09    


 


Calcium  9.3 mg/dL (8.5-10.1)   06/26/20  12:09    














- Allergies


Allergies/Adverse Reactions: 


                                    Allergies











Allergy/AdvReac Type Severity Reaction Status Date / Time


 


No Known Allergies Allergy   Verified 06/26/20 12:13














- Blood


Blood Available: No





- Anesthesia Plan


Pre-Op Medication Ordered: None





- Acknowledgements


Anesthesia Type Planned: MAC


Pt an Appropriate Candidate for the Planned Anesthesia: Yes


Alternatives and Risks of Anesthesia Discussed w Pt/Guardian: Yes


Pt/Guardian Understands and Agrees with Anesthesia Plan: Yes





PreAnesthesia Questionnaire


HEENT History: Reports: Other (See Below)


Other HEENT History: wears glasses


Cardiovascular History: Reports: Hypertension


Respiratory History: Reports: Other (See Below) (former smoker quit 6-7 years 

ago)


Gastrointestinal History: Reports: Hepatitis


Other Gastrointestinal History: hx hepatitis C, has been treated and is cleared


Genitourinary History: Reports: Dialysis


Other Genitourinary History: chronic renal failure, dialysis 3 times weekly 

since april 2017- last dyalisis yesterday


Hematologic History: Reports: Blood Transfusion(s)





- Past Surgical History


Head Surgeries/Procedures: Reports: None


Cardiovascular Surgical History: Reports: Other (See Below)


Other Cardiovascular Surgeries/Procedures: creation of AV fistula left arm


GI Surgical History: Reports: Colonoscopy (last one 5 years ago), Hernia, 

Abdominal, Hernia, Inguinal


Other GI Surgeries/Procedures: Umbilical hernia repair


Other Male  Surgeries/Procedures: creation of art A-V fistula





- SUBSTANCE USE


Smoking Status *Q: Former Smoker


Tobacco Use Within Last Twelve Months: No


Recreational Drug Use History: No





- HOME MEDS


Home Medications: 


                                    Home Meds





Lisinopril 40 mg PO BID 08/22/17 [History]


Multivitamin [Multivitamins] 1 tab PO DAILY 08/22/17 [History]


NIFEdipine [Nifedipine] 60 mg PO BID 08/22/17 [History]


carvediloL [Coreg] 12.5 mg PO DAILY 06/23/20 [History]











- CURRENT (IN HOUSE) MEDS


Current Meds: 





                               Current Medications





Sodium Chloride (Normal Saline)  500 mls @ 75 mls/hr IV ASDIRECTED DIXIE


Sodium Chloride (Saline Flush)  10 ml FLUSH ASDIRECTED PRN


   PRN Reason: Keep Vein Open


Sodium Chloride (Saline Flush)  2.5 ml FLUSH ASDIRECTED PRN


   PRN Reason: Keep Vein Open


Sodium Chloride (Saline Flush)  10 ml FLUSH ASDIRECTED PRN


   PRN Reason: Keep Vein Open


Sodium Chloride (Saline Flush)  2.5 ml FLUSH ASDIRECTED PRN


   PRN Reason: Keep Vein Open


Sodium Chloride (Normal Saline)  10 ml IV ASDIRECTED PRN


   PRN Reason: IV Use





Discontinued Medications





Fentanyl (Sublimaze) Confirm Administered Dose 100 mcg .ROUTE .STK-MED ONE


   Stop: 06/26/20 12:56


Lactated Ringer's (Ringers, Lactated)  1,000 mls @ 125 mls/hr IV ASDIRECTED DIXIE


Sodium Chloride (Normal Saline)  1,000 mls @ 75 mls/hr IV ASDIRECTED DIXIE


Lidocaine (Xylocaine-Mpf 2%) Confirm Administered Dose 5 ml .ROUTE .STK-MED ONE


   Stop: 06/26/20 12:55


Propofol (Diprivan  20 Ml) Confirm Administered Dose 400 mg .ROUTE .STK-MED ONE


   Stop: 06/26/20 12:55
yes

## 2020-06-26 NOTE — PCM.POSTAN
POST ANESTHESIA ASSESSMENT





- VITAL SIGNS


Vital Signs: 


                                Last Vital Signs











Temp  36.2 C   06/26/20 13:32


 


Pulse  74   06/26/20 14:44


 


Resp  16   06/26/20 14:44


 


BP  103/71   06/26/20 14:44


 


Pulse Ox  97   06/26/20 14:44














- RESPIRATORY


Respiratory Status: Respiratory Rate WNL





- CARDIOVASCULAR


CV Status: Pulse Rate WNL, Slow Pulse Rate





- GASTROINTESTINAL


GI Status: No Symptoms





- POST OP HYDRATION


Hydration Status: Adequate & Stable

## 2020-06-26 NOTE — PCM.OPNOTE
- General Post-Op/Procedure Note


Date of Surgery/Procedure: 06/26/20


Operative Procedure(s): Diagnostic colonoscopy with polypectomy


Findings: 





Cecal polyp, hepatic flexure polyp x 2, sigmoid colon polyp x 3, rectal polyp, 

diverticulosis 


Pre Op Diagnosis: History of colon polyps


Post-Op Diagnosis: Cecal polyp, hepatic flexure polyp x 2, sigmoid colon polyp x

3, rectal polyp, diverticulosis


Anesthesia Technique: MAC


Primary Surgeon: Yessi Young


Condition: Good


Free Text/Narrative:: 


                                 Intake & Output











 06/25/20 06/26/20 06/26/20





 22:59 06:59 14:59


 


Intake Total   500


 


Balance   500

## 2020-06-28 NOTE — OR
SURGEON:

YESSI YOUNG MD

 

DATE OF PROCEDURE:  06/26/2020

 

PREOPERATIVE DIAGNOSIS:

History of colon polyps.

 

POSTOPERATIVE DIAGNOSES:

1. Cecal polyp.

2. Hepatic flexure polyps x2.

3. Sigmoid colon polyps x3.

4. Rectal polyps.

5. Diverticulosis.

 

PROCEDURE PERFORMED:

Diagnostic colonoscopy with polypectomy.

 

PRIMARY SURGEON:

Yessi Young MD

 

ANESTHESIA:

MAC.

 

INSTRUMENT USED:

Olympus colonoscope.

 

EXTENT OF EXAM:

To the cecum.

 

PREPARATION:

Good.

 

LIMITATIONS:

None.

 

INDICATIONS FOR EXAMINATION:

The patient is a 64-year-old male who presents for repeat colonoscopy.  He has a

history of colon polyps.  The patient and I discussed the procedure, expected

perioperative course, and the risks.  He verbalized understanding and wishes to

proceed.

 

PROCEDURE IN DETAIL:

The patient was brought into the endoscopy suite and placed in a left lateral

decubitus position.  A time-out was completed verifying the patient's name, age,

date of birth, allergies, and procedure to be performed.  Monitored anesthesia

care was induced and continuous oxygen was provided via nasal cannula throughout

the procedure.  After adequate sedation was achieved, a digital rectal exam was

performed.  This exam was within normal limits.  A well-lubricated colonoscope

was inserted in the rectum and advanced under direct visualization to the level

of the cecum.  The cecum was identified by both visual and anatomic landmarks.

A photograph was taken of the cecal cap; however, I was unable to retroflex the

scope within the cecum due to looping of the scope more proximally.  The scope

was then fully withdrawn while examining the color, texture, anatomy, and

integrity of mucosa from the cecum to the anal canal.  In the cecal cap adjacent

to the appendiceal orifice was a small sessile cecal polyp.  This was removed in

piecemeal fashion using cold biopsy forceps.  At the hepatic flexure, the

patient was noted to have two sessile hepatic flexure polyps.  These were around

corners and difficult to access, but were eventually removed in piecemeal

fashion using cold biopsy forceps.  Throughout the sigmoid colon, the patient

was noted to have three more sigmoid colon polyps.  These were all removed in

piecemeal fashion using cold biopsy forceps.  The scope was then brought into

the rectum and retroflexed to allow visualization of the anal canal opening.

Upon retroflexion, a rectal polyp was noted.  This was removed using a cold

snare.  The polyp was then suctioned and removed.  The rectal biopsy site was

hemostatic.  A photograph was taken.  The scope was straightened out and fully

withdrawn.  The cecum to anus time was 46 minutes.  The patient tolerated the

procedure well and was transferred to the PACU in stable condition.

 

ENDOSCOPIC DIAGNOSES:

1. Cecal polyp.

2. Hepatic flexure polyps x2.

3. Sigmoid colon polyps x3.

4. Rectal polyps.

5. Diverticulosis.

 

RECOMMENDATIONS:

Follow up in clinic in 2 weeks.

 

 

MIQUEL ALLEN

DD:  06/28/2020 20:54:38

DT:  06/28/2020 21:08:29

Job #:  [id number]

## 2021-11-30 ENCOUNTER — HOSPITAL ENCOUNTER (OUTPATIENT)
Dept: HOSPITAL 56 - MW.SDS | Age: 66
Discharge: HOME | End: 2021-11-30
Attending: SURGERY
Payer: OTHER GOVERNMENT

## 2021-11-30 VITALS — HEART RATE: 60 BPM | SYSTOLIC BLOOD PRESSURE: 124 MMHG | DIASTOLIC BLOOD PRESSURE: 75 MMHG

## 2021-11-30 DIAGNOSIS — Z87.891: ICD-10-CM

## 2021-11-30 DIAGNOSIS — Z98.890: ICD-10-CM

## 2021-11-30 DIAGNOSIS — Z79.899: ICD-10-CM

## 2021-11-30 DIAGNOSIS — E78.5: ICD-10-CM

## 2021-11-30 DIAGNOSIS — N18.6: ICD-10-CM

## 2021-11-30 DIAGNOSIS — I12.9: ICD-10-CM

## 2021-11-30 DIAGNOSIS — Z12.11: Primary | ICD-10-CM

## 2021-11-30 DIAGNOSIS — D12.3: ICD-10-CM

## 2021-11-30 DIAGNOSIS — Z99.2: ICD-10-CM

## 2021-11-30 PROCEDURE — 45380 COLONOSCOPY AND BIOPSY: CPT

## 2021-11-30 NOTE — PCM.OPNOTE
- General Post-Op/Procedure Note


Date of Surgery/Procedure: 11/30/21


Operative Procedure(s): Diagnostic colonoscopy with polypectomy


Findings: 


transverse colon polyps x 2


Pre Op Diagnosis: History of colon polyps


Post-Op Diagnosis: Transverse colon polyp x 2


Anesthesia Technique: MAC


Primary Surgeon: Yessi Young


Condition: Good

## 2021-11-30 NOTE — [id number]
Post Anesthesia Note





- EVALUATION WITHIN 48HRS OF ANESTHETIC


Vital Signs in Normal Range: Yes


Patient Participated in Evaluation: Yes


Respiratory Function Stable: Yes


Airway Patent: Yes


Cardiovascular Function Stable: Yes


Hydration Status Stable: Yes


Pain Control Satisfactory: Yes


Nausea and Vomiting Control Satisfactory: Yes


Mental Status Recovered: Yes


Vital Signs: 


                                Last Vital Signs











Temp  98.4 F   11/30/21 08:38


 


Pulse  59 L  11/30/21 08:48


 


Resp  14   11/30/21 08:48


 


BP  99/56 L  11/30/21 08:48


 


Pulse Ox  96   11/30/21 08:48














- COMMENTS/OBSERVATIONS


Free Text/Narrative:: 





Pt doing well post-op. VSS. No apparent anesthetic complications.





Dr. Amado Lr

## 2021-11-30 NOTE — OR
SURGEON:

YESSI YOUNG MD

 

DATE OF PROCEDURE:  11/30/2021

 

PREOPERATIVE DIAGNOSIS:

History of colon polyps.

 

POSTOPERATIVE DIAGNOSIS:

Transverse colon polyp x2.

 

PROCEDURE PERFORMED:

Diagnostic colonoscopy with polypectomy.

 

PRIMARY SURGEON:

Yessi Young MD

 

ANESTHESIA:

MAC.

 

INSTRUMENT USED:

Olympus colonoscope.

 

EXTENT OF EXAM:

To the cecum.

 

PREPARATION:

Good.

 

LIMITATIONS:

None.

 

INDICATIONS FOR EXAMINATION:

The patient is a 65-year-old male with end-stage renal disease, on dialysis, who

presented last year for screening colonoscopy.  He was found to have multiple

polyps throughout his colon.  The decision was made to proceed with a repeat

colonoscopy this year.  I explained the procedure, expected perioperative

course, and the risks.  He verbalized understanding and wishes to proceed.

 

PROCEDURE IN DETAIL:

The patient was brought in to the endoscopy suite and placed in a left lateral

decubitus position.  A time-out was completed verifying the patient's name, age,

date of birth, allergies, and procedure to be performed.  Monitored anesthesia

care was induced and continuous oxygen was provided via face mask throughout the

procedure.  After adequate sedation was achieved, a digital rectal exam was

performed.  This exam was within normal limits.  A well-lubricated colonoscope

was inserted into the rectum and advanced under direct visualization to the

level of the cecum.  The cecum was identified by both visual and anatomic

landmarks.  A photograph was taken of the cecal cap, however, I was unable to

retroflex the scope within the cecum due to looping of the scope more

proximally.  The scope was then fully withdrawn while examining the color,

texture, anatomy, and integrity of the mucosa from the cecum to the anal canal.

The patient was found to have two small sessile polyps within the transverse

colon.  A photograph of one of these was taken.  These were removed in piecemeal

fashion using cold biopsy forceps.  The remainder of the colon appeared normal.

The scope was brought into the rectum and retroflexed to allow visualization of

the anal canal opening.  The patient did have some mildly enlarged internal

hemorrhoids, but otherwise everything appeared normal.  A photograph of this was

taken.  The scope was then straightened out and fully withdrawn.  The cecum to

anus time was 19 minutes.  The patient was awoken and taken to PACU in stable

condition.

 

ENDOSCOPIC DIAGNOSIS:

Transverse colon polyp x2.

 

RECOMMENDATION:

Follow up in clinic in two weeks.

 

 

MIQUEL ALLEN

DD:  11/30/2021 08:44:04

DT:  11/30/2021 13:57:08

Job #:  [id number]

## 2021-11-30 NOTE — PCM.POSTAN
POST ANESTHESIA ASSESSMENT





- MENTAL STATUS


Mental Status: Somnolent





- VITAL SIGNS


Vital Signs: 


                                Last Vital Signs











Temp  97.9 F   11/30/21 06:35


 


Pulse  76   11/30/21 06:35


 


Resp  14   11/30/21 06:35


 


BP  160/91 H  11/30/21 06:35


 


Pulse Ox  98   11/30/21 06:35














- RESPIRATORY


Respiratory Status: Respiratory Rate WNL, Airway Patent, O2 Saturation Stable





- CARDIOVASCULAR


CV Status: Pulse Rate WNL, Blood Pressure Stable





- GASTROINTESTINAL


GI Status: No Symptoms





- PAIN


Free Text/Narrative:: 





Resting comfortably





- POST OP HYDRATION


Hydration Status: Adequate & Stable

## 2021-11-30 NOTE — PCM.PREANE
Preanesthetic Assessment





- Procedure


Proposed Procedure: 





Colonoscopy





- Anesthesia/Transfusion/Family Hx


Anesthesia History: Prior Anesthesia Without Reaction


Family History of Anesthesia Reaction: No


Transfusion History: No Prior Transfusion(s)


Intubation History: Unknown





- Review of Systems


General: No Symptoms


Pulmonary: No Symptoms (Former Smoker)


Cardiovascular: No Symptoms (HTN)


Gastrointestinal: No Symptoms (H/0 Diverticulosis and colon polyps)


Neurological: No Symptoms


Other: Reports: None (CRI on dialysis T, TH, SA), Liver Problems (h/o Hep C 

which has been treated and cleared)





- Physical Assessment


NPO Status Date: 11/28/21


NPO Status Time: 22:00


Vital Signs: 





                                Last Vital Signs











Temp  97.9 F   11/30/21 06:35


 


Pulse  76   11/30/21 06:35


 


Resp  14   11/30/21 06:35


 


BP  160/91 H  11/30/21 06:35


 


Pulse Ox  98   11/30/21 06:35











Height: 5 ft 10 in


Weight: 78.925 kg


ASA Class: 3


Mental Status: Alert & Oriented x3


Airway Class: Mallampati = 2


Dentition: Reports: Edentulous


Thyro-Mental Finger Breadths: 3


Mouth Opening Finger Breadths: 3


ROM/Head Extension: Full


Lungs: Clear to Auscultation, Normal Respiratory Effort


Cardiovascular: Regular Rate, Regular Rhythm





- Allergies


Allergies/Adverse Reactions: 


                                    Allergies











Allergy/AdvReac Type Severity Reaction Status Date / Time


 


No Known Allergies Allergy   Verified 11/24/21 07:13














- Acknowledgements


Anesthesia Type Planned: General Anesthesia


Pt an Appropriate Candidate for the Planned Anesthesia: Yes


Alternatives and Risks of Anesthesia Discussed w Pt/Guardian: Yes


Pt/Guardian Understands and Agrees with Anesthesia Plan: Yes





PreAnesthesia Questionnaire


HEENT History: Reports: Other (See Below)


Other HEENT History: wears glasses/contacts


Cardiovascular History: Reports: Hypertension


Respiratory History: Reports: None


Gastrointestinal History: Reports: Colon Polyp, Diverticulosis, Hepatitis


Other Gastrointestinal History: hx hepatitis C, has been treated and is cleared


Genitourinary History: Reports: Chronic Renal Insuffiency, Dialysis


Other Genitourinary History: chronic renal failure, dialysis 3 times weekly 

(tues-thurs & saturday)


Musculoskeletal History: Reports: None


Neurological History: Reports: None


Psychiatric History: Reports: None


Endocrine/Metabolic History: Reports: None


Hematologic History: Reports: None


Immunologic History: Reports: None


Oncologic (Cancer) History: Reports: None


Dermatologic History: Reports: None





- Past Surgical History


Head Surgeries/Procedures: Reports: None


HEENT Surgical History: Reports: None


Cardiovascular Surgical History: Reports: Other (See Below)


Other Cardiovascular Surgeries/Procedures: creation of AV fistula left arm


Respiratory Surgical History: Reports: None


GI Surgical History: Reports: Colonoscopy, Hernia, Abdominal, Hernia, Inguinal


Other GI Surgeries/Procedures: Umbilical hernia repair, inguinal hernia repair


Endocrine Surgical History: Reports: None


Neurological Surgical History: Reports: None


Musculoskeletal Surgical History: Reports: None


Oncologic Surgical History: Reports: None


Dermatological Surgical History: Reports: None





- SUBSTANCE USE


Tobacco Use Status *Q: Former Tobacco User


Tobacco Use Within Last Twelve Months: No





- HOME MEDS


Home Medications: 


                                    Home Meds





Lisinopril 40 mg PO BID 08/22/17 [History]


Multivitamin [Multivitamins] 1 tab PO DAILY 08/22/17 [History]


NIFEdipine [Nifedipine] 80 mg PO DAILY 08/22/17 [History]


Calcium Carbonate [Tums] 1 tab.chew CHEW ASDIRECTED 11/24/21 [History]











- CURRENT (IN HOUSE) MEDS


Current Meds: 





                               Current Medications





Lactated Ringer's (Ringers, Lactated)  1,000 mls @ 125 mls/hr IV ASDIRECTED DIXIE


Sodium Chloride (Sodium Chloride 0.9% 10 Ml Syringe)  10 ml FLUSH ASDIRECTED PRN


   PRN Reason: Keep Vein Open


Sodium Chloride (Sodium Chloride 0.9% 2.5 Ml Syringe)  2.5 ml FLUSH ASDIRECTED 

PRN


   PRN Reason: Keep Vein Open


Sodium Chloride (Sodium Chloride 0.9% 10 Ml Syringe)  10 ml FLUSH ASDIRECTED PRN


   PRN Reason: Keep Vein Open


Sodium Chloride (Sodium Chloride 0.9% 2.5 Ml Syringe)  2.5 ml FLUSH ASDIRECTED 

PRN


   PRN Reason: Keep Vein Open


Sodium Chloride (Sodium Chloride 0.9% 20 Ml Sdv)  10 ml IV ASDIRECTED PRN


   PRN Reason: IV Use